# Patient Record
Sex: FEMALE | Race: WHITE | NOT HISPANIC OR LATINO | Employment: FULL TIME | URBAN - METROPOLITAN AREA
[De-identification: names, ages, dates, MRNs, and addresses within clinical notes are randomized per-mention and may not be internally consistent; named-entity substitution may affect disease eponyms.]

---

## 2023-04-07 ENCOUNTER — OFFICE VISIT (OUTPATIENT)
Dept: URGENT CARE | Facility: CLINIC | Age: 23
End: 2023-04-07

## 2023-04-07 VITALS
OXYGEN SATURATION: 98 % | TEMPERATURE: 99.9 F | BODY MASS INDEX: 39.44 KG/M2 | DIASTOLIC BLOOD PRESSURE: 100 MMHG | WEIGHT: 231 LBS | RESPIRATION RATE: 16 BRPM | SYSTOLIC BLOOD PRESSURE: 167 MMHG | HEART RATE: 90 BPM | HEIGHT: 64 IN

## 2023-04-07 DIAGNOSIS — H69.83 EUSTACHIAN TUBE DYSFUNCTION, BILATERAL: Primary | ICD-10-CM

## 2023-04-07 RX ORDER — FLUTICASONE PROPIONATE 50 MCG
1 SPRAY, SUSPENSION (ML) NASAL 2 TIMES DAILY
Qty: 16 G | Refills: 0 | Status: SHIPPED | OUTPATIENT
Start: 2023-04-07

## 2023-04-07 NOTE — PROGRESS NOTES
[address] [facility]        NAME: Modesta Tolbert is a 25 y o  female  : 2000    MRN: [id number]  DATE: 2023  TIME: 1:30 PM    Assessment and Plan   Eustachian tube dysfunction, bilateral [H69 83]  1  Eustachian tube dysfunction, bilateral  fluticasone (FLONASE) 50 mcg/act nasal spray        Bilateral eustachian tube dysfunction likely secondary to allergic rhinitis  Flonase prescribed to counteract symptoms  Patient also advised on taking a daily antihistamine such as Zyrtec for the next 2 weeks as a trial   If symptoms return afterwards, this likely confirms the presence of seasonal allergies  Patient Instructions     Follow up with PCP in 3-5 days  Proceed to  ER if symptoms worsen  Chief Complaint     Chief Complaint   Patient presents with   • Cold Like Symptoms     Pt reports of sinus pain and pressure x 3 days  History of Present Illness       59-year-old female presents today with acute on chronic sinus pressure, nasal congestion, bilateral otalgia, mild headaches and lightheadedness  Reports that her current flareup is significantly worse compared to the past   Denies any obvious fevers, chills, chest pain, dyspnea or abdominal symptoms  Denies any obvious sick contacts  Review of Systems   Review of Systems   Constitutional: Positive for fever (mild)  Negative for chills  HENT: Positive for congestion, ear pain and sinus pressure  Negative for rhinorrhea  Respiratory: Negative for cough and shortness of breath  Cardiovascular: Negative for chest pain  Gastrointestinal: Negative for abdominal pain and nausea  Neurological: Positive for light-headedness and headaches (mild)       Current Medications       Current Outpatient Medications:   •  fluticasone (FLONASE) 50 mcg/act nasal spray, 1 spray into each nostril 2 (two) times a day, Disp: 16 g, Rfl: 0  •  Norgestim-Eth Estrad Triphasic (TRI-SPRINTEC PO), Take by mouth, Disp: , Rfl:     Current Allergies "    Allergies as of 04/07/2023 - Reviewed 04/07/2023   Allergen Reaction Noted   • Amoxicillin Hives 04/07/2023            The following portions of the patient's history were reviewed and updated as appropriate: allergies, current medications, past family history, past medical history, past social history, past surgical history and problem list      History reviewed  No pertinent past medical history  Past Surgical History:   Procedure Laterality Date   • KNEE SURGERY         History reviewed  No pertinent family history  Medications have been verified  Objective   /100   Pulse (!) 120   Temp 99 9 °F (37 7 °C)   Resp 16   Ht 5' 4\" (1 626 m)   Wt 105 kg (231 lb)   SpO2 98%   BMI 39 65 kg/m²   No LMP recorded  Physical Exam     Physical Exam  Vitals and nursing note reviewed  Constitutional:       General: She is in acute distress  Appearance: Normal appearance  She is not ill-appearing, toxic-appearing or diaphoretic  HENT:      Head: Normocephalic and atraumatic  Right Ear: Tympanic membrane, ear canal and external ear normal  There is no impacted cerumen  Left Ear: Tympanic membrane, ear canal and external ear normal  There is no impacted cerumen  Nose: Nose normal  No congestion  Mouth/Throat:      Mouth: Mucous membranes are moist       Pharynx: No posterior oropharyngeal erythema  Eyes:      General:         Right eye: No discharge  Left eye: No discharge  Conjunctiva/sclera: Conjunctivae normal    Cardiovascular:      Rate and Rhythm: Normal rate and regular rhythm  Pulmonary:      Effort: Pulmonary effort is normal  No respiratory distress  Breath sounds: Normal breath sounds  No wheezing, rhonchi or rales  Skin:     General: Skin is warm  Findings: No erythema  Neurological:      General: No focal deficit present  Mental Status: She is alert and oriented to person, place, and time     Psychiatric:         Mood " and Affect: Mood normal          Behavior: Behavior normal          Thought Content:  Thought content normal          Judgment: Judgment normal